# Patient Record
Sex: MALE | Race: WHITE | Employment: FULL TIME | URBAN - METROPOLITAN AREA
[De-identification: names, ages, dates, MRNs, and addresses within clinical notes are randomized per-mention and may not be internally consistent; named-entity substitution may affect disease eponyms.]

---

## 2017-01-08 ENCOUNTER — HOSPITAL ENCOUNTER (OUTPATIENT)
Age: 22
Discharge: HOME | End: 2017-01-08
Attending: EMERGENCY MEDICINE
Payer: COMMERCIAL

## 2017-01-08 VITALS
RESPIRATION RATE: 16 BRPM | BODY MASS INDEX: 20.73 KG/M2 | OXYGEN SATURATION: 98 % | TEMPERATURE: 98 F | HEART RATE: 74 BPM | HEIGHT: 69 IN | WEIGHT: 140 LBS | SYSTOLIC BLOOD PRESSURE: 123 MMHG | DIASTOLIC BLOOD PRESSURE: 84 MMHG

## 2017-01-08 DIAGNOSIS — J06.9 VIRAL URI: Primary | ICD-10-CM

## 2017-01-08 PROCEDURE — 99213 OFFICE O/P EST LOW 20 MIN: CPT

## 2017-01-08 PROCEDURE — 99214 OFFICE O/P EST MOD 30 MIN: CPT

## 2017-01-08 RX ORDER — FLUTICASONE PROPIONATE 50 MCG
2 SPRAY, SUSPENSION (ML) NASAL DAILY
Qty: 16 G | Refills: 0 | Status: SHIPPED | OUTPATIENT
Start: 2017-01-08 | End: 2021-05-05

## 2017-01-08 RX ORDER — METHYLPREDNISOLONE 4 MG/1
TABLET ORAL
Qty: 1 PACKAGE | Refills: 0 | Status: SHIPPED | OUTPATIENT
Start: 2017-01-08 | End: 2020-11-18

## 2017-01-08 NOTE — ED PROVIDER NOTES
Patient presents with:  Cough/URI    HPI:     Kalani Gardner is a 24year old male who presents with chief complaint of nasal congestion. Started yesterday am.  Unable to breath from his nose. Took mandeep seltzer cold and that helped until it wore off.   No f

## 2017-01-08 NOTE — ED INITIAL ASSESSMENT (HPI)
Sinus congestion for couple days, difficulty to breath through nose. Not sleeping well. No fevers. No coughing, or shortness of breath.

## 2017-06-19 ENCOUNTER — HOSPITAL ENCOUNTER (OUTPATIENT)
Age: 22
Discharge: HOME | End: 2017-06-19
Attending: EMERGENCY MEDICINE
Payer: COMMERCIAL

## 2017-06-19 VITALS
BODY MASS INDEX: 22.22 KG/M2 | RESPIRATION RATE: 18 BRPM | TEMPERATURE: 98 F | OXYGEN SATURATION: 99 % | WEIGHT: 150 LBS | HEIGHT: 69 IN | DIASTOLIC BLOOD PRESSURE: 89 MMHG | HEART RATE: 89 BPM | SYSTOLIC BLOOD PRESSURE: 137 MMHG

## 2017-06-19 DIAGNOSIS — J02.9 VIRAL PHARYNGITIS: Primary | ICD-10-CM

## 2017-06-19 DIAGNOSIS — K12.0 CANKER SORE: ICD-10-CM

## 2017-06-19 PROCEDURE — 87430 STREP A AG IA: CPT | Performed by: EMERGENCY MEDICINE

## 2017-06-19 PROCEDURE — 87081 CULTURE SCREEN ONLY: CPT | Performed by: EMERGENCY MEDICINE

## 2017-06-19 PROCEDURE — 99214 OFFICE O/P EST MOD 30 MIN: CPT

## 2017-06-19 PROCEDURE — 99213 OFFICE O/P EST LOW 20 MIN: CPT

## 2017-06-19 NOTE — ED PROVIDER NOTES
Patient presents with:  Sore Throat    HPI:     Juan Alberto Graves is a 20year old male who presents with chief complaint of sore throat. Started yesterday with sore throat. No congestion, cough. Has been exposed to someone at work who was sick.  No known st

## 2018-12-24 ENCOUNTER — HOSPITAL (OUTPATIENT)
Dept: OTHER | Age: 23
End: 2018-12-24
Attending: FAMILY MEDICINE

## 2018-12-24 LAB — S PYO AG THROAT QL: NEGATIVE

## 2018-12-26 LAB — CULTURE STREP GRP A (STTH) HL: NORMAL

## 2019-09-17 ENCOUNTER — PATIENT OUTREACH (OUTPATIENT)
Dept: FAMILY MEDICINE CLINIC | Facility: CLINIC | Age: 24
End: 2019-09-17

## 2019-12-12 ENCOUNTER — PATIENT OUTREACH (OUTPATIENT)
Dept: FAMILY MEDICINE CLINIC | Facility: CLINIC | Age: 24
End: 2019-12-12

## 2020-03-02 ENCOUNTER — HOSPITAL ENCOUNTER (OUTPATIENT)
Age: 25
Discharge: HOME | End: 2020-03-02
Payer: COMMERCIAL

## 2020-03-02 VITALS
SYSTOLIC BLOOD PRESSURE: 127 MMHG | BODY MASS INDEX: 25 KG/M2 | WEIGHT: 170 LBS | HEART RATE: 108 BPM | RESPIRATION RATE: 16 BRPM | OXYGEN SATURATION: 98 % | TEMPERATURE: 100 F | DIASTOLIC BLOOD PRESSURE: 80 MMHG

## 2020-03-02 DIAGNOSIS — J10.1 INFLUENZA A: Primary | ICD-10-CM

## 2020-03-02 LAB
POCT INFLUENZA A: POSITIVE
POCT INFLUENZA B: NEGATIVE

## 2020-03-02 PROCEDURE — 99214 OFFICE O/P EST MOD 30 MIN: CPT

## 2020-03-02 PROCEDURE — 99213 OFFICE O/P EST LOW 20 MIN: CPT

## 2020-03-02 PROCEDURE — 87502 INFLUENZA DNA AMP PROBE: CPT | Performed by: NURSE PRACTITIONER

## 2020-03-02 RX ORDER — OSELTAMIVIR PHOSPHATE 75 MG/1
75 CAPSULE ORAL 2 TIMES DAILY
Qty: 10 CAPSULE | Refills: 0 | Status: SHIPPED | OUTPATIENT
Start: 2020-03-02 | End: 2020-03-07

## 2020-03-02 RX ORDER — ACETAMINOPHEN 500 MG
1000 TABLET ORAL ONCE
Status: COMPLETED | OUTPATIENT
Start: 2020-03-02 | End: 2020-03-02

## 2020-03-02 NOTE — ED PROVIDER NOTES
Patient Seen in: [address] [facility]      History   Patient presents with:  Cough/URI  Fever    Stated Complaint: flu like symptoms    44-year-old male presents today with flulike symptoms. Symptoms started yesterday.   Dad had similar sympto ear canal normal.      Nose: Mucosal edema, congestion and rhinorrhea present. Mouth/Throat:      Mouth: Mucous membranes are moist.      Pharynx: Uvula midline. Posterior oropharyngeal erythema present.    Eyes:      Conjunctiva/sclera: Conjunctivae n

## 2020-09-14 NOTE — ED INITIAL ASSESSMENT (HPI)
Pt with c/o headache, fever, sore throat, cough and congestion. Pt with c/o lower back pain. Pt with symptoms that started yesterday. English

## 2020-11-18 ENCOUNTER — HOSPITAL ENCOUNTER (OUTPATIENT)
Age: 25
Discharge: HOME | End: 2020-11-18
Payer: COMMERCIAL

## 2020-11-18 VITALS
RESPIRATION RATE: 14 BRPM | SYSTOLIC BLOOD PRESSURE: 133 MMHG | HEART RATE: 96 BPM | OXYGEN SATURATION: 96 % | DIASTOLIC BLOOD PRESSURE: 94 MMHG | TEMPERATURE: 98 F

## 2020-11-18 DIAGNOSIS — Z20.822 SUSPECTED COVID-19 VIRUS INFECTION: Primary | ICD-10-CM

## 2020-11-18 PROCEDURE — 99213 OFFICE O/P EST LOW 20 MIN: CPT | Performed by: NURSE PRACTITIONER

## 2020-11-18 NOTE — ED PROVIDER NOTES
Patient Seen in: Immediate [address] [place] [address]      History   Patient presents with:  Loss Of Smell Or Taste    Stated Complaint: In car [phone] exposure-loss of smell    50-year-old male presents today with URI symptoms and decrease in smell.   Recent Mucosal edema present. Mouth/Throat:      Pharynx: Uvula midline. Posterior oropharyngeal erythema present. Eyes:      Conjunctiva/sclera: Conjunctivae normal.      Pupils: Pupils are equal, round, and reactive to light.    Neck:      Musculoskeletal

## 2020-11-18 NOTE — ED INITIAL ASSESSMENT (HPI)
Pt sts informed today of close contact with covid positive person. Noticed a decrease in smell, mild dry cough last night. Denies other covid symptoms.

## 2021-03-27 ENCOUNTER — HOSPITAL ENCOUNTER (OUTPATIENT)
Age: 26
Discharge: OTHER INSTITUTION | End: 2021-03-27
Payer: COMMERCIAL

## 2021-03-27 VITALS
RESPIRATION RATE: 16 BRPM | OXYGEN SATURATION: 100 % | DIASTOLIC BLOOD PRESSURE: 80 MMHG | SYSTOLIC BLOOD PRESSURE: 123 MMHG | TEMPERATURE: 98 F | HEART RATE: 81 BPM

## 2021-03-27 DIAGNOSIS — R55 SYNCOPE, UNSPECIFIED SYNCOPE TYPE: Primary | ICD-10-CM

## 2021-03-27 PROCEDURE — 99213 OFFICE O/P EST LOW 20 MIN: CPT | Performed by: NURSE PRACTITIONER

## 2021-03-27 PROCEDURE — 93000 ELECTROCARDIOGRAM COMPLETE: CPT | Performed by: NURSE PRACTITIONER

## 2021-03-27 NOTE — ED NOTES
Patient states he did have an episode similar to this approximately 7 years ago. Denies dizziness at this time.

## 2021-03-27 NOTE — ED INITIAL ASSESSMENT (HPI)
Patient states he was sitting in a restaurant at approximately 0900 today. Began to feel dizzy. Laid down in the moser and EMS was called. They did an EKG and assessed him. He refused to go to the ED at that time.  Deneis having any chest pain and palpitati

## 2021-03-27 NOTE — ED PROVIDER NOTES
Patient Seen in: Immediate [facility]      History   Patient presents with:  Dizziness  Syncope    Stated Complaint: passed out    HPI/Subjective:   58-year-old male presents today with history of presyncopal episode.   States was sitting having breakfas reviewed. All other systems reviewed and negative except as noted above.     Physical Exam     ED Triage Vitals [03/27/21 1127]   /80   Pulse 81   Resp 16   Temp 97.8 °F (36.6 °C)   Temp src Temporal   SpO2 100 %   O2 Device None (Room air)

## 2021-03-30 LAB
ATRIAL RATE: 83 BPM
P AXIS: 67 DEGREES
P-R INTERVAL: 122 MS
Q-T INTERVAL: 384 MS
QRS DURATION: 84 MS
QTC CALCULATION (BEZET): 451 MS
R AXIS: 47 DEGREES
T AXIS: 53 DEGREES
VENTRICULAR RATE: 83 BPM

## 2021-05-05 ENCOUNTER — OFFICE VISIT (OUTPATIENT)
Dept: FAMILY MEDICINE CLINIC | Facility: CLINIC | Age: 26
End: 2021-05-05
Payer: COMMERCIAL

## 2021-05-05 DIAGNOSIS — R42 LIGHTHEADED: Primary | ICD-10-CM

## 2021-05-05 PROCEDURE — 83036 HEMOGLOBIN GLYCOSYLATED A1C: CPT | Performed by: FAMILY MEDICINE

## 2021-05-05 PROCEDURE — 99214 OFFICE O/P EST MOD 30 MIN: CPT | Performed by: FAMILY MEDICINE

## 2021-05-05 PROCEDURE — 3008F BODY MASS INDEX DOCD: CPT | Performed by: FAMILY MEDICINE

## 2021-05-05 PROCEDURE — 84443 ASSAY THYROID STIM HORMONE: CPT | Performed by: FAMILY MEDICINE

## 2021-05-05 PROCEDURE — 3074F SYST BP LT 130 MM HG: CPT | Performed by: FAMILY MEDICINE

## 2021-05-05 PROCEDURE — 3079F DIAST BP 80-89 MM HG: CPT | Performed by: FAMILY MEDICINE

## 2021-05-05 NOTE — PROGRESS NOTES
Patient presents with:  Dizziness       HPI:    Patient ID: Carter Watt is a 31year old male here today c/o lightheadedness. Lasts few hours. No syncope. No vertigo. Feels like it is triggered by stress. Feels more when has to out outside.  Elmer he had (1.753 m)   Wt 153 lb (69.4 kg)   SpO2 98%   BMI 22.59 kg/m²    Physical Exam  Vitals reviewed. Constitutional:       General: He is not in acute distress.   HENT:      Right Ear: Tympanic membrane and ear canal normal.      Left Ear: Tympanic membrane an

## 2021-05-09 VITALS
HEART RATE: 100 BPM | HEIGHT: 69 IN | DIASTOLIC BLOOD PRESSURE: 84 MMHG | TEMPERATURE: 98 F | OXYGEN SATURATION: 98 % | RESPIRATION RATE: 18 BRPM | BODY MASS INDEX: 22.66 KG/M2 | SYSTOLIC BLOOD PRESSURE: 120 MMHG | WEIGHT: 153 LBS

## 2024-10-16 ENCOUNTER — PATIENT OUTREACH (OUTPATIENT)
Dept: FAMILY MEDICINE CLINIC | Facility: CLINIC | Age: 29
End: 2024-10-16

## (undated) NOTE — ED AVS SNAPSHOT
THE [facility] in [facility] [address] [address]    Phone:  [phone]    Fax:  Marilin Manley [phone]   MRN: [id number]    Department:  THE [facility] in [place]   Date of Visit:  1/8/2017           Diagnos [phone] [address] [facility], [address]  [phone] 1024 [address] [place] [address]   [phone]       To Check ER Wait Times:  TEXT 'Luis Angel Ratliff' to [id number]      Click [email or web address] reading, you will be contacted. Please make sure we have your correct phone number before you leave. After you leave, you should follow the attached instructions. I have read and understand the instructions given to me by my caregivers.         24-Hour Sign up for [employer], your secure online medical record. [employer] will allow you to access patient instructions from your recent visit,  view other health information, and more. To sign up or find more information, go to [email or web address] and cl

## (undated) NOTE — ED AVS SNAPSHOT
[facility] in [facility] [address] [address]    Phone:  [phone]    Fax:  Marilin [phone]   MRN: [id number]    Department:  [facility] [place]   Date of Visit:  6/19/2017           Diagno nuestro adminstrador de daniella cohen [phone]. Expect to receive an electronic request (by e-mail or text) to complete a self-assessment the day after your visit. You may also receive a call from our patient liason soon after your visit.  Also, some [facility] [address] ([address]) [address]  ([address]) [address] [facility]. ([address]) [address] [facility] [address]. (8 not sign up before the expiration date, you must request a new code. Your unique F?rsat Bu F?rsat Access Code: [email or web address]  Expires: 8/18/2017  5:19 PM    If you have questions, you can call [phone] to talk to our [facility] Staff.  Remember, F?rsat Bu F[employer]rsat

## (undated) NOTE — LETTER
Date & Time: 3/27/2021, 11:48 AM  Patient: Gene Can  Encounter Provider(s):    SATYA Martines         This certifies that Anderson Hammond, a patient at an [facility] facility, am leaving the facility voluntarily and against